# Patient Record
Sex: FEMALE | Race: WHITE | Employment: OTHER | URBAN - METROPOLITAN AREA
[De-identification: names, ages, dates, MRNs, and addresses within clinical notes are randomized per-mention and may not be internally consistent; named-entity substitution may affect disease eponyms.]

---

## 2017-01-26 ENCOUNTER — OFFICE VISIT (OUTPATIENT)
Dept: FAMILY MEDICINE CLINIC | Age: 82
End: 2017-01-26

## 2017-01-26 VITALS
TEMPERATURE: 97.8 F | OXYGEN SATURATION: 94 % | BODY MASS INDEX: 26.83 KG/M2 | DIASTOLIC BLOOD PRESSURE: 78 MMHG | SYSTOLIC BLOOD PRESSURE: 160 MMHG | HEART RATE: 98 BPM | WEIGHT: 142 LBS

## 2017-01-26 DIAGNOSIS — Z85.3 HISTORY OF BREAST CANCER: ICD-10-CM

## 2017-01-26 DIAGNOSIS — S09.90XA HEAD TRAUMA, INITIAL ENCOUNTER: ICD-10-CM

## 2017-01-26 DIAGNOSIS — I48.0 INTERMITTENT ATRIAL FIBRILLATION (HCC): ICD-10-CM

## 2017-01-26 PROCEDURE — 99214 OFFICE O/P EST MOD 30 MIN: CPT | Performed by: FAMILY MEDICINE

## 2017-01-26 RX ORDER — MOMETASONE FUROATE 1 MG/G
CREAM TOPICAL
Qty: 15 G | Refills: 1 | Status: SHIPPED | OUTPATIENT
Start: 2017-01-26 | End: 2018-12-11

## 2017-05-10 ENCOUNTER — OFFICE VISIT (OUTPATIENT)
Dept: FAMILY MEDICINE CLINIC | Age: 82
End: 2017-05-10

## 2017-05-10 VITALS
HEART RATE: 84 BPM | SYSTOLIC BLOOD PRESSURE: 136 MMHG | BODY MASS INDEX: 27.4 KG/M2 | DIASTOLIC BLOOD PRESSURE: 78 MMHG | WEIGHT: 145 LBS | OXYGEN SATURATION: 95 % | RESPIRATION RATE: 16 BRPM | TEMPERATURE: 98 F

## 2017-05-10 DIAGNOSIS — I48.0 INTERMITTENT ATRIAL FIBRILLATION (HCC): ICD-10-CM

## 2017-05-10 DIAGNOSIS — B35.4 TINEA CORPORIS: Primary | ICD-10-CM

## 2017-07-19 ENCOUNTER — OFFICE VISIT (OUTPATIENT)
Dept: FAMILY MEDICINE CLINIC | Age: 82
End: 2017-07-19

## 2017-07-19 VITALS
SYSTOLIC BLOOD PRESSURE: 162 MMHG | TEMPERATURE: 97.9 F | OXYGEN SATURATION: 95 % | DIASTOLIC BLOOD PRESSURE: 82 MMHG | BODY MASS INDEX: 27.68 KG/M2 | WEIGHT: 146.5 LBS | HEART RATE: 76 BPM | RESPIRATION RATE: 18 BRPM

## 2017-07-19 DIAGNOSIS — L30.8 OTHER ECZEMA: ICD-10-CM

## 2017-07-19 DIAGNOSIS — I48.0 INTERMITTENT ATRIAL FIBRILLATION (HCC): ICD-10-CM

## 2017-07-19 DIAGNOSIS — M65.332 TRIGGER FINGER, LEFT MIDDLE FINGER: ICD-10-CM

## 2017-07-19 PROBLEM — L30.9 ECZEMA: Status: ACTIVE | Noted: 2017-07-19

## 2017-08-15 ENCOUNTER — OFFICE VISIT (OUTPATIENT)
Dept: FAMILY MEDICINE CLINIC | Age: 82
End: 2017-08-15

## 2017-08-15 VITALS
SYSTOLIC BLOOD PRESSURE: 150 MMHG | TEMPERATURE: 97.9 F | HEART RATE: 76 BPM | OXYGEN SATURATION: 95 % | RESPIRATION RATE: 18 BRPM | DIASTOLIC BLOOD PRESSURE: 84 MMHG

## 2017-08-15 DIAGNOSIS — R03.0 BLOOD PRESSURE ELEVATED WITHOUT HISTORY OF HTN: Primary | ICD-10-CM

## 2017-08-15 DIAGNOSIS — I63.20 CEREBRAL INFARCTION DUE TO OCCLUSION OF PRECEREBRAL ARTERY (HCC): ICD-10-CM

## 2017-09-13 ENCOUNTER — OUTSIDE SERVICES (OUTPATIENT)
Dept: FAMILY MEDICINE CLINIC | Age: 82
End: 2017-09-13

## 2017-09-13 VITALS
SYSTOLIC BLOOD PRESSURE: 150 MMHG | TEMPERATURE: 97.9 F | DIASTOLIC BLOOD PRESSURE: 84 MMHG | HEART RATE: 76 BPM | OXYGEN SATURATION: 95 % | RESPIRATION RATE: 18 BRPM

## 2017-09-13 DIAGNOSIS — L30.9 ECZEMA, UNSPECIFIED TYPE: ICD-10-CM

## 2017-09-13 DIAGNOSIS — I10 ESSENTIAL HYPERTENSION: Primary | ICD-10-CM

## 2017-11-07 ENCOUNTER — OUTSIDE SERVICES (OUTPATIENT)
Dept: FAMILY MEDICINE CLINIC | Age: 82
End: 2017-11-07

## 2017-11-07 VITALS
WEIGHT: 150 LBS | RESPIRATION RATE: 18 BRPM | TEMPERATURE: 98 F | SYSTOLIC BLOOD PRESSURE: 185 MMHG | OXYGEN SATURATION: 96 % | HEART RATE: 73 BPM | BODY MASS INDEX: 28.34 KG/M2 | DIASTOLIC BLOOD PRESSURE: 80 MMHG

## 2017-11-07 DIAGNOSIS — L30.9 ECZEMA, UNSPECIFIED TYPE: ICD-10-CM

## 2017-11-07 DIAGNOSIS — I10 UNCONTROLLED HYPERTENSION: Primary | ICD-10-CM

## 2017-11-07 DIAGNOSIS — I48.0 INTERMITTENT ATRIAL FIBRILLATION (HCC): ICD-10-CM

## 2017-11-07 NOTE — PROGRESS NOTES
goiter, trachea midline. Chest:  Respirations easy and unlabored              Lungs - clear to auscultation     Breath sounds - equal  Heart:   Rhythm - regular              Murmur - none   Gallop - none               JVD - none              Pretibial pitting edema - none  She has an erythematous, scaly flaking rash on her left hand. Her rash on her knees appears to be doing well. Maci Galvan MD    The note was completed using Dragon voice recognition transcription. Every effort was made to ensure accuracy; however, inadvertent  transcription errors may be present despite my best efforts to edit errors.

## 2017-12-13 ENCOUNTER — OUTSIDE SERVICES (OUTPATIENT)
Dept: FAMILY MEDICINE CLINIC | Age: 82
End: 2017-12-13

## 2017-12-13 DIAGNOSIS — I10 UNCONTROLLED HYPERTENSION: Primary | ICD-10-CM

## 2017-12-13 NOTE — PROGRESS NOTES
[facility] ASSISTED LIVING PROGRESS NOTE    Patient: Chloe Mark     : 1926   Date of service:  2017      Assessment and plan  1. Uncontrolled hypertension  Increase amlodipine up to 5 mg a day      All chronic problems evaluated today are stable or controlled unless noted above. Continue all current medications and treatment plan as outlined in orders    Subjective  Patient seen at the [facility] assisted living facility for routine nursing home followup and for reevaluation of her uncontrolled hypertension. Her blood pressures are a little better but consistently above 140/90 with 2.5 mg of amlodipine. She is tolerating it well. She denies any headache dizziness shortness of breath or weakness. 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 Patient is pleasant and cooperative, in no acute distress  Neck has no masses or goiter. .  Chest:  Respirations easy and unlabored              Lungs - clear to auscultation     Breath sounds - equal  Heart:   Rhythm - regular              Murmur - none   Gallop - none               JVD - none              Pretibial pitting edema - none            Alicia Mondragon MD    The note was completed using Dragon voice recognition transcription. Every effort was made to ensure accuracy; however, inadvertent  transcription errors may be present despite my best efforts to edit errors.

## 2018-01-09 ENCOUNTER — OUTSIDE SERVICES (OUTPATIENT)
Dept: FAMILY MEDICINE CLINIC | Age: 83
End: 2018-01-09

## 2018-01-09 VITALS
OXYGEN SATURATION: 94 % | RESPIRATION RATE: 18 BRPM | BODY MASS INDEX: 27.96 KG/M2 | DIASTOLIC BLOOD PRESSURE: 79 MMHG | HEART RATE: 90 BPM | WEIGHT: 148 LBS | TEMPERATURE: 98.5 F | SYSTOLIC BLOOD PRESSURE: 145 MMHG

## 2018-01-09 DIAGNOSIS — I10 BENIGN ESSENTIAL HYPERTENSION: ICD-10-CM

## 2018-01-09 DIAGNOSIS — I48.0 INTERMITTENT ATRIAL FIBRILLATION (HCC): Primary | ICD-10-CM

## 2018-01-09 NOTE — PROGRESS NOTES
[facility] ASSISTED LIVING PROGRESS NOTE    Patient: Chester Harper     : 1926   Date of service:  2018      Assessment and plan  1. Intermittent atrial fibrillation (HCC) Asymptomatic     2. Benign essential hypertension Now stable       All chronic problems evaluated today are stable or controlled unless noted above. Continue all current medications and treatment plan as outlined in orders    Subjective  Patient seen at the [facility] assisted living facility for routine nursing home followup and for reevaluation of her medical problems. She is now on 5 mg of amlodipine a report she is tolerating it well. Her blood pressures are coming down into a much better range. She's not had any symptoms of A. fib. 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 She's in no acute distress. She is alert and oriented  Chest:  Respirations easy and unlabored              Lungs - clear to auscultation     Breath sounds - equal  Heart:   Rhythm - regular              Murmur - none   Gallop - none               JVD - none              Pretibial pitting edema - none            Salty Grewal MD    The note was completed using Dragon voice recognition transcription. Every effort was made to ensure accuracy; however, inadvertent  transcription errors may be present despite my best efforts to edit errors.

## 2018-01-20 PROBLEM — S42.341B: Status: ACTIVE | Noted: 2018-01-20

## 2018-01-21 ENCOUNTER — CLINICAL DOCUMENTATION (OUTPATIENT)
Dept: ORTHOPEDIC SURGERY | Age: 83
End: 2018-01-21

## 2018-01-24 ENCOUNTER — OUTSIDE SERVICES (OUTPATIENT)
Dept: FAMILY MEDICINE CLINIC | Age: 83
End: 2018-01-24

## 2018-01-24 DIAGNOSIS — I63.20 CEREBRAL INFARCTION DUE TO OCCLUSION OF PRECEREBRAL ARTERY (HCC): ICD-10-CM

## 2018-01-24 DIAGNOSIS — G56.31 NEUROPATHY OF RIGHT RADIAL NERVE: ICD-10-CM

## 2018-01-24 DIAGNOSIS — I48.0 INTERMITTENT ATRIAL FIBRILLATION (HCC): ICD-10-CM

## 2018-01-24 DIAGNOSIS — I10 BENIGN ESSENTIAL HYPERTENSION: ICD-10-CM

## 2018-01-24 DIAGNOSIS — S42.341B: Primary | ICD-10-CM

## 2018-01-24 PROCEDURE — 99305 1ST NF CARE MODERATE MDM 35: CPT | Performed by: FAMILY MEDICINE

## 2018-01-24 NOTE — PROGRESS NOTES
mouth daily.  Coenzyme Q10 (COQ10 PO) Take  by mouth daily. No current facility-administered medications for this visit. Allergies   Allergen Reactions    Lisinopril Other (See Comments)     Diarrhea, weakness, malaise    Tetracyclines & Related      intolerance    Pcn [Penicillins] Rash        Family History   Problem Relation Age of Onset    Cancer Mother     Heart Disease Father     Diabetes Paternal Grandmother         Social History   Substance Use Topics    Smoking status: Never Smoker    Smokeless tobacco: Never Used    Alcohol use No         Immunization History   Administered Date(s) Administered    Pneumococcal Polysaccharide ([id number]) 11/19/2003       Review of systems:    Constitutional:     Unexplained weight loss - no     Fever - no  Eye:     Drainage/matting - no     Blurred vision - no  ENT:     Head congestion - no     Hearing loss - no  Respiratory     Dyspnea  - no     Wheeze - no  Cardiac:     Chest pain or discomfort - no     Edema - no     Gastrointestinal     Constipation - no     Diarrhea - no  Urologic     Urinary incontinence - no     Dysuria - no  Skin:     Rash - no     Decubitus - no  Neurologic     Headache - no     Dizziness - no     Psychiatric        Anxiety - no     Depression - no    Objective:  Vitals 4/94/1087   SYSTOLIC 737    DIASTOLIC 68    Pulse 88   Temp 97.1   Resp 88       Patient is alert, oriented, and in no acute distress. She has a resolving ecchymosis with no deformity on the right side of her face. There is a minor abrasion here.   Eyes:  Conjunctivae and lids are clear             Pupils are equal, round, and reactive, irises nondeformed  Ears:  External ears and nose unremarkable  Mouth:  Lips, gums, tongue, oral mucosa unremarkable  Throat: Palates unremarkable               Pharynx clear  Nose: clear  Neck:  No masses, trachea midline, phonation normal, thyroid unremarkable  Lymphatics:  No significant cervical or supraclavicular adenopathy  Chest:  No deformity of thorax              Respirations easy and unlabored with equal breath sounds              Lungs clear  Heart:  Rhythm - regular               Murmurs - none              Gallop - none               JVD - none              Pretibial edema - none  Abdomen:  Soft  with no masses noted                     Hernia - none                    Liver and spleen not palpably enlarged                    Bowel sounds are normally active                    Tenderness - none                    Rebound or rididity - none  Neurologic:  Cranial nerves 2-12 are intact except for some mild dysarthria which is old                     No focal motor deficits found except for a mildly weak right hand grasp with clear weakness of flexion and extension of the thumb                     Reflexes in lower extremities are intact and symmetrical  Skin: Warm and dry, turgor normal           No rash            No decubitus. No palpable subcutaneous masses  Psychiatric: mood and affect intact                     speech and thought processes seem appropriate     The note was completed using Dragon voice recognition transcription. Every effort was made to ensure accuracy; however, inadvertent  transcription errors may be present despite my best efforts to edit errors.

## 2018-02-12 ENCOUNTER — HOSPITAL ENCOUNTER (OUTPATIENT)
Dept: OCCUPATIONAL THERAPY | Age: 83
Discharge: HOME | End: 2018-02-12
Attending: ORTHOPAEDIC SURGERY | Admitting: ORTHOPAEDIC SURGERY

## 2018-02-12 ENCOUNTER — OFFICE VISIT (OUTPATIENT)
Dept: ORTHOPEDIC SURGERY | Age: 83
End: 2018-02-12

## 2018-02-12 ENCOUNTER — HOSPITAL ENCOUNTER (OUTPATIENT)
Dept: OCCUPATIONAL THERAPY | Age: 83
Discharge: HOME | End: 2018-02-28
Admitting: ORTHOPAEDIC SURGERY

## 2018-02-12 VITALS — HEIGHT: 61 IN | BODY MASS INDEX: 27.94 KG/M2 | WEIGHT: 148 LBS

## 2018-02-12 DIAGNOSIS — S42.351A CLOSED DISPLACED COMMINUTED FRACTURE OF SHAFT OF RIGHT HUMERUS, INITIAL ENCOUNTER: ICD-10-CM

## 2018-02-12 DIAGNOSIS — M89.8X2 PAIN OF RIGHT HUMERUS: Primary | ICD-10-CM

## 2018-02-12 PROCEDURE — 99024 POSTOP FOLLOW-UP VISIT: CPT | Performed by: ORTHOPAEDIC SURGERY

## 2018-02-12 NOTE — PROGRESS NOTES
[address] [place], [address]                  [] [place]     [] [facility]            [] [facility]           [x] More Salazar      []  [facility] ([facility])  ________________________________________________________________________    Patient: Shyla Mendoza   : 1926  MRN: [id number]  Referring Physician: Referring Practitioner: Dr. Benita Santiago    Evaluation Date: 2018      Medical Diagnosis Information:  Diagnosis: R radial nerve palsy, s/p humeral fx/ORIF (M89.932)           Occupational Therapy Splint Certification Form  Dear Benita Santiago,  The following patient has been evaluated for occupational therapy services for fabrication of a hand/wrist extension brace. Medicare requires the referring physician to review the treatment plan. Please review the attached evaluation and/or summary of the patient's plan of care, and verify that you agree by signing the attached document and sending it back to our office. Plan of Care/Treatment to date:  [x] Fabrication of custom hand/wrist resting splint       [x] Instruction on splint use, care and wearing schedule        [x] Follow up as needed for splint modifications          Frequency/Duration:  [x] One time visit for splint fabrication and instructions. Follow up as needed for splint modifcations        [] Splint fabricated, patient to return for full evaluation.     Rehab Potential: [x] good [] fair  [] poor       SPLINT EVALUATION    Date: 2018  Name: Shyla Mendoza            : 1926      Medical/Treatment Diagnosis Information:  · Diagnosis: R radial nerve palsy, s/p humeral fx/ORIF (Y56.312)  Insurance/Certification information:  OT Insurance Information: Medicare  Physician Information:  Referring Practitioner: Dr. Benita Nunez MD Appointment: 18    Subjective  History of Injury/ Mechanism of Injury: Fall onto arm  Surgery Date: 1/21/18  Dominant Hand:    [x] Right []Left  Occupational/Vocational Status: retired  Progress of any previous OT/PT: the patient [x]has/ []has not received OT/PT previously for this diagnosis. Pain: 7/10    Objective Findings:   ROM, strength, edema, wound/ scar appearance, function:    Type of splint:   Resting hand/wrist, all fingers  Splint protocol utilization:     Splint Purpose: [x]Immobilize or protect []Promote healing of    []Relieve pain  []Provide support for improved hand function []Maximize joint motion    Treatment:   [x]Splint provided ([x]Customized/ []Prefabricated), and splint rationale explained. []Patient instructed in []wear/ []care of splint and educated regarding diagnosis. []Patient instructed in symptom reduction techniques   []HEP instruction    []Discussed ADL assistive device    Written Information Distributed: []HEP  []Splint care and wearing protocol    Patient response to evaluation and instructions:  [x]Attentive/interested   []Asked questions/ retained info  []Appeared disinterested  []Poor retention of information  []Appeared anxious/ fearful    Assessment and Plan:  Goals: [x]Patient will be able to verbalize rationale for, and demonstrate proper wearing     of splint. [x]Splint will provide proper fit and function. []Patient will be able to verbalize 2-3 ways to prevent further symptoms. []Patient will be able to don and doff independently. []Patient will be independent with HEP    Goals met:  [x]yes []no    Plan:  [x]Splint completed with good fit and function. Hand Therapy to follow up for     splint modifications as needed    []Splint completed; OT/PT evaluation initiated. Patient to return for further     treatment.     Madan Rodriguez OT/L, [facility]

## 2018-02-14 ENCOUNTER — OUTSIDE SERVICES (OUTPATIENT)
Dept: FAMILY MEDICINE CLINIC | Age: 83
End: 2018-02-14

## 2018-02-14 VITALS
BODY MASS INDEX: 29.78 KG/M2 | RESPIRATION RATE: 18 BRPM | SYSTOLIC BLOOD PRESSURE: 179 MMHG | OXYGEN SATURATION: 94 % | TEMPERATURE: 98.6 F | HEART RATE: 86 BPM | DIASTOLIC BLOOD PRESSURE: 75 MMHG | WEIGHT: 157.6 LBS

## 2018-02-14 DIAGNOSIS — I10 BENIGN ESSENTIAL HYPERTENSION: ICD-10-CM

## 2018-02-14 DIAGNOSIS — D63.8 ANEMIA OF CHRONIC DISEASE: ICD-10-CM

## 2018-02-14 DIAGNOSIS — S42.341D: Primary | ICD-10-CM

## 2018-02-14 DIAGNOSIS — G56.31 NEUROPATHY OF RIGHT RADIAL NERVE: ICD-10-CM

## 2018-02-14 PROCEDURE — 99309 SBSQ NF CARE MODERATE MDM 30: CPT | Performed by: FAMILY MEDICINE

## 2018-02-14 NOTE — PROGRESS NOTES
[facility] PROGRESS NOTE    Patient: Armida Youssef     : 1926   Date of service:  2018    Status: skilled     Assessment and plan  1. Open displaced spiral fracture of shaft of right humerus with routine healing, subsequent encounter - improving    2. Neuropathy of right radial nerve - slowly improving    3. Benign essential hypertension - uncontrolled Increase amlodipine to 10 mg q day   4. Anemia of chronic disease stable    Continue all current medications and treatment plan as outlined in orders    Subjective  Patient seen at the [facility] facility for routine nursing home followup and for reevaluation of her medical problems. She has no complaints. Her arm seems to be healing well with no significant pain issues. She still has the weakness of her right hand grasps. She is wearing a brace, but reports it is definitely improving. Unfortunately her blood pressure has been up more lately. She otherwise feels well. Nurses report no other issues. 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 Objective  Vitals    SYSTOLIC 976   DIASTOLIC 75   Pulse 86   Temp 98.6   Resp 18   Weight 157 lb 9.6 oz   Height -   BMI (wt*703/ht~2) -   Pain Level -   Some recent data might be hidden      Patient is Alert and in no acute distress. Neck has no masses or goiter, trachea midline.   Chest:  Respirations easy and unlabored

## 2018-02-16 DIAGNOSIS — S42.341D: Primary | ICD-10-CM

## 2018-02-16 RX ORDER — HYDROCODONE BITARTRATE AND ACETAMINOPHEN 5; 325 MG/1; MG/1
1 TABLET ORAL EVERY 4 HOURS PRN
Qty: 40 TABLET | Refills: 0 | Status: SHIPPED | OUTPATIENT
Start: 2018-02-16 | End: 2018-02-23 | Stop reason: HOSPADM

## 2018-02-26 ENCOUNTER — OFFICE VISIT (OUTPATIENT)
Dept: ORTHOPEDIC SURGERY | Age: 83
End: 2018-02-26

## 2018-02-26 VITALS — WEIGHT: 148 LBS | HEIGHT: 61 IN | BODY MASS INDEX: 27.94 KG/M2

## 2018-02-26 DIAGNOSIS — M89.8X2 PAIN OF RIGHT HUMERUS: Primary | ICD-10-CM

## 2018-02-26 PROCEDURE — 99024 POSTOP FOLLOW-UP VISIT: CPT | Performed by: ORTHOPAEDIC SURGERY

## 2018-02-26 NOTE — PROGRESS NOTES
she was nonambulatory today in a wheelchair. Reflex normal    Additional Comments:       Additional Examinations:         Left Upper Extremity: Examination of the left upper extremity does not show any tenderness, deformity or injury. Range of motion is unremarkable. There is no gross instability. There are no rashes, ulcerations or lesions. Strength and tone are normal.    Radiology:     X-rays obtained and reviewed in office (2 views of right humerus)  Satisfactory alignment of the fracture. Good placement of the plate. No change in alignment since surgery      Assessment :  Status post a humeral shaft fracture open reduction internal fixation with a radial nerve palsy    Impression:  Encounter Diagnosis   Name Primary?  Pain of right humerus Yes       Office Procedures:  Orders Placed This Encounter   Procedures    XR HUMERUS RIGHT (MIN 2 VIEWS)     Order Specific Question:   Reason for exam:     Answer:   Arm Pain       Treatment Plan: Today we are going to discontinue her sling and then also the Ace wrap on her upper arm. She'll continue to use the splint on her forearm. We suggested to therapy that she begin some gentle range of motion of her elbow. I think it's okay for her to return to assisted living which is essentially down the building from where she is now in skilled care. She will follow up with us again in 3 weeks with repeat x-ray of her humerus.

## 2018-03-01 ENCOUNTER — HOSPITAL ENCOUNTER (OUTPATIENT)
Dept: OCCUPATIONAL THERAPY | Age: 83
Discharge: HOME | End: 2018-03-31
Attending: ORTHOPAEDIC SURGERY | Admitting: ORTHOPAEDIC SURGERY

## 2018-03-07 ENCOUNTER — OUTSIDE SERVICES (OUTPATIENT)
Dept: FAMILY MEDICINE CLINIC | Age: 83
End: 2018-03-07

## 2018-03-07 DIAGNOSIS — I10 BENIGN ESSENTIAL HYPERTENSION: ICD-10-CM

## 2018-03-07 DIAGNOSIS — G56.31 NEUROPATHY OF RIGHT RADIAL NERVE: ICD-10-CM

## 2018-03-07 DIAGNOSIS — S42.341D: Primary | ICD-10-CM

## 2018-03-07 PROCEDURE — 99308 SBSQ NF CARE LOW MDM 20: CPT | Performed by: FAMILY MEDICINE

## 2018-03-19 ENCOUNTER — OFFICE VISIT (OUTPATIENT)
Dept: ORTHOPEDIC SURGERY | Age: 83
End: 2018-03-19

## 2018-03-19 VITALS — BODY MASS INDEX: 27.94 KG/M2 | HEIGHT: 61 IN | WEIGHT: 148 LBS

## 2018-03-19 DIAGNOSIS — M89.8X2 PAIN OF RIGHT HUMERUS: Primary | ICD-10-CM

## 2018-03-19 PROCEDURE — 99024 POSTOP FOLLOW-UP VISIT: CPT | Performed by: ORTHOPAEDIC SURGERY

## 2018-03-19 NOTE — PROGRESS NOTES
There are no rashes, ulcerations or lesions. Strength and tone are normal.    Radiology:     X-rays obtained and reviewed in office (2 views of right humerus)  I think her fracture appears to be healed. Assessment :  Status post a humeral shaft fracture open reduction internal fixation with a radial nerve palsy. Seems that her fracture is healed and her radial nerve palsy is gradually improving. Impression:  Encounter Diagnosis   Name Primary?  Pain of right humerus Yes       Office Procedures:  Orders Placed This Encounter   Procedures    XR HUMERUS RIGHT (MIN 2 VIEWS)     Order Specific Question:   Reason for exam:     Answer:   Arm Pain       Treatment Plan: Today we will suggest that they continue to perform therapy for the right hand wrist elbow and shoulder. We can let her discontinue the use of her hand splint during the day she must wear it starting about 8 PM through the night. I think it's okay for her to return to her apartment. I thought that they were to do that after her last visit. Her son today talked about getting her lift chair other than the regular recliner    She'll follow up with me again in about 8 weeks. No reason to repeat x-ray the arm.

## 2018-04-02 ENCOUNTER — TELEPHONE (OUTPATIENT)
Dept: FAMILY MEDICINE CLINIC | Age: 83
End: 2018-04-02

## 2018-04-02 DIAGNOSIS — S42.341D: ICD-10-CM

## 2018-04-02 RX ORDER — HYDROCODONE BITARTRATE AND ACETAMINOPHEN 5; 325 MG/1; MG/1
1 TABLET ORAL EVERY 4 HOURS PRN
Qty: 90 TABLET | Refills: 0 | Status: SHIPPED | OUTPATIENT
Start: 2018-04-02 | End: 2018-05-02

## 2018-05-21 ENCOUNTER — OFFICE VISIT (OUTPATIENT)
Dept: ORTHOPEDIC SURGERY | Age: 83
End: 2018-05-21

## 2018-05-21 VITALS — WEIGHT: 148 LBS | HEIGHT: 61 IN | BODY MASS INDEX: 27.94 KG/M2

## 2018-05-21 DIAGNOSIS — S42.351A CLOSED DISPLACED COMMINUTED FRACTURE OF SHAFT OF RIGHT HUMERUS, INITIAL ENCOUNTER: Primary | ICD-10-CM

## 2018-05-21 PROCEDURE — G8419 CALC BMI OUT NRM PARAM NOF/U: HCPCS | Performed by: PHYSICIAN ASSISTANT

## 2018-05-21 PROCEDURE — 1123F ACP DISCUSS/DSCN MKR DOCD: CPT | Performed by: PHYSICIAN ASSISTANT

## 2018-05-21 PROCEDURE — 1036F TOBACCO NON-USER: CPT | Performed by: PHYSICIAN ASSISTANT

## 2018-05-21 PROCEDURE — G8598 ASA/ANTIPLAT THER USED: HCPCS | Performed by: PHYSICIAN ASSISTANT

## 2018-05-21 PROCEDURE — 4040F PNEUMOC VAC/ADMIN/RCVD: CPT | Performed by: PHYSICIAN ASSISTANT

## 2018-05-21 PROCEDURE — 99213 OFFICE O/P EST LOW 20 MIN: CPT | Performed by: PHYSICIAN ASSISTANT

## 2018-05-21 PROCEDURE — G8427 DOCREV CUR MEDS BY ELIG CLIN: HCPCS | Performed by: PHYSICIAN ASSISTANT

## 2018-05-21 PROCEDURE — 1090F PRES/ABSN URINE INCON ASSESS: CPT | Performed by: PHYSICIAN ASSISTANT

## 2018-06-13 ENCOUNTER — OUTSIDE SERVICES (OUTPATIENT)
Dept: FAMILY MEDICINE CLINIC | Age: 83
End: 2018-06-13

## 2018-06-13 DIAGNOSIS — M79.89 SWELLING OF BOTH LOWER EXTREMITIES: ICD-10-CM

## 2018-06-13 DIAGNOSIS — I10 BENIGN ESSENTIAL HYPERTENSION: Primary | ICD-10-CM

## 2018-06-13 DIAGNOSIS — G56.31 NEUROPATHY OF RIGHT RADIAL NERVE: ICD-10-CM

## 2018-06-13 DIAGNOSIS — I48.0 INTERMITTENT ATRIAL FIBRILLATION (HCC): ICD-10-CM

## 2018-08-07 ENCOUNTER — OUTSIDE SERVICES (OUTPATIENT)
Dept: FAMILY MEDICINE CLINIC | Age: 83
End: 2018-08-07

## 2018-08-07 DIAGNOSIS — I10 BENIGN ESSENTIAL HYPERTENSION: ICD-10-CM

## 2018-08-07 DIAGNOSIS — G56.31 NEUROPATHY OF RIGHT RADIAL NERVE: ICD-10-CM

## 2018-08-07 DIAGNOSIS — L20.84 INTRINSIC ECZEMA: Primary | ICD-10-CM

## 2018-08-07 DIAGNOSIS — I48.0 INTERMITTENT ATRIAL FIBRILLATION (HCC): ICD-10-CM

## 2018-08-07 NOTE — PROGRESS NOTES
Allergic rhinosinusitis      Review of systems negative for headache, dizziness, edema, or shortness of breath    Objective  Blood pressure today is 167/70, pulse 80 respirations 20 temp 97.8. Patient is alert, oriented, no acute distress. Neck has no masses or goiter, trachea midline. Chest:  Respirations easy and unlabored              Lungs - clear to auscultation     Breath sounds - equal  Heart:   Regular rhythm with no murmur rub or gallop. Ears no JVD or pitting peripheral edema. Examination of her extremities finds a large patch on her left palm of raised rough dry cracked skin consistent with her eczema            Tracey Lange MD    The note was completed using Dragon voice recognition transcription. Every effort was made to ensure accuracy; however, inadvertent  transcription errors may be present despite my best efforts to edit errors.

## 2018-09-14 ENCOUNTER — OFFICE VISIT (OUTPATIENT)
Dept: FAMILY MEDICINE CLINIC | Age: 83
End: 2018-09-14

## 2018-09-14 VITALS
SYSTOLIC BLOOD PRESSURE: 152 MMHG | WEIGHT: 153 LBS | HEART RATE: 84 BPM | TEMPERATURE: 98 F | OXYGEN SATURATION: 94 % | BODY MASS INDEX: 28.91 KG/M2 | DIASTOLIC BLOOD PRESSURE: 72 MMHG

## 2018-09-14 DIAGNOSIS — H91.93 BILATERAL HEARING LOSS, UNSPECIFIED HEARING LOSS TYPE: ICD-10-CM

## 2018-09-14 DIAGNOSIS — D48.5 NEOPLASM OF UNCERTAIN BEHAVIOR OF SKIN: Primary | ICD-10-CM

## 2018-09-14 DIAGNOSIS — L20.84 INTRINSIC ECZEMA: ICD-10-CM

## 2018-09-14 DIAGNOSIS — I10 BENIGN ESSENTIAL HYPERTENSION: ICD-10-CM

## 2018-09-14 PROCEDURE — 1090F PRES/ABSN URINE INCON ASSESS: CPT | Performed by: FAMILY MEDICINE

## 2018-09-14 PROCEDURE — G8598 ASA/ANTIPLAT THER USED: HCPCS | Performed by: FAMILY MEDICINE

## 2018-09-14 PROCEDURE — 1036F TOBACCO NON-USER: CPT | Performed by: FAMILY MEDICINE

## 2018-09-14 PROCEDURE — 1101F PT FALLS ASSESS-DOCD LE1/YR: CPT | Performed by: FAMILY MEDICINE

## 2018-09-14 PROCEDURE — 99213 OFFICE O/P EST LOW 20 MIN: CPT | Performed by: FAMILY MEDICINE

## 2018-09-14 PROCEDURE — G8419 CALC BMI OUT NRM PARAM NOF/U: HCPCS | Performed by: FAMILY MEDICINE

## 2018-09-14 PROCEDURE — 1123F ACP DISCUSS/DSCN MKR DOCD: CPT | Performed by: FAMILY MEDICINE

## 2018-09-14 PROCEDURE — 17000 DESTRUCT PREMALG LESION: CPT | Performed by: FAMILY MEDICINE

## 2018-09-14 PROCEDURE — G8427 DOCREV CUR MEDS BY ELIG CLIN: HCPCS | Performed by: FAMILY MEDICINE

## 2018-09-14 PROCEDURE — 4040F PNEUMOC VAC/ADMIN/RCVD: CPT | Performed by: FAMILY MEDICINE

## 2018-09-14 ASSESSMENT — PATIENT HEALTH QUESTIONNAIRE - PHQ9
2. FEELING DOWN, DEPRESSED OR HOPELESS: 0
1. LITTLE INTEREST OR PLEASURE IN DOING THINGS: 0
SUM OF ALL RESPONSES TO PHQ QUESTIONS 1-9: 0
SUM OF ALL RESPONSES TO PHQ9 QUESTIONS 1 & 2: 0
SUM OF ALL RESPONSES TO PHQ QUESTIONS 1-9: 0

## 2018-09-14 NOTE — PROGRESS NOTES
Assessment and plan  1. Neoplasm of uncertain behavior of skin  The lesion was thoroughly frozen with liquid nitrogen. I advised her if it was not resolved in 2-3 weeks to notify us so we could refer for excision    2. Bilateral hearing loss, unspecified hearing loss type  Likely presbycusis    3. Benign essential hypertension  Under adequate control    4. Intrinsic eczema  Reassured her she could use the Elocon b.i.d. prn    Return to clinic or call prn if these symptoms worsen or fail to improve and resolve as discussed. Otherwise we'll reassess at assisted living as scheduled    Subjective  Patient is in the assisted living in [facility]. She called and had a friend bring her up with multiple concerns. The first was a slowly changing lesion on the bridge of her nose. The second one was worsening bilateral hearing loss. She wears hearing aids as is concerned there was wax. She also when my opinion on if she was using her Elocon right on her left hand. She also went to me to do a cardiovascular exam for her blood pressure. Objective  BP (!) 152/72   Pulse 84   Temp 98 °F (36.7 °C) (Oral)   Wt 153 lb (69.4 kg)   SpO2 94%   BMI 28.91 kg/m²   Patient is alert, oriented, and in no acute distress. Psych: mood and affect unremarkable                speech and thought processes intact  ENT: External ears canals and TMs are unremarkable. On the bridge of her nose is a several millimeter rough nodular lesion that could be early squamous cell or actinic keratosis  Chest:  No deformity of thorax              Respirations easy and unlabored with equal breath sounds              Lungs clear  Heart:  Regular rhythm. No murmur or JVD. Examination of her hand finds a few patches of scaling. Beverley Bess MD    The note was completed using Dragon voice recognition transcription.  Every effort was made to ensure accuracy; however, inadvertent  transcription errors may be present despite my best efforts to edit errors.

## 2018-09-14 NOTE — PATIENT INSTRUCTIONS
Please compare this printed medication list with your medications at home to be sure they are the same. If you have any medications that are different please contact us immediately at [phone]. Also review your allergies that we have listed, these may also include medications that you have not been able to tolerate, make sure everything listed is correct. If you have any allergies that are different please contact us immediately at [phone].

## 2018-10-09 ENCOUNTER — OUTSIDE SERVICES (OUTPATIENT)
Dept: FAMILY MEDICINE CLINIC | Age: 83
End: 2018-10-09
Payer: MEDICARE

## 2018-10-09 DIAGNOSIS — I63.20 CEREBRAL INFARCTION DUE TO OCCLUSION OF PRECEREBRAL ARTERY (HCC): ICD-10-CM

## 2018-10-09 DIAGNOSIS — G56.31 NEUROPATHY OF RIGHT RADIAL NERVE: ICD-10-CM

## 2018-10-09 DIAGNOSIS — I48.0 INTERMITTENT ATRIAL FIBRILLATION (HCC): ICD-10-CM

## 2018-10-09 DIAGNOSIS — I10 BENIGN ESSENTIAL HYPERTENSION: Primary | ICD-10-CM

## 2018-10-09 NOTE — PROGRESS NOTES
[facility] ASSISTED LIVING PROGRESS NOTE    Patient: Rosalino Watters     : 1926   Date of service:  10/9/2018      Assessment and plan   Diagnosis Orders   1. Benign essential hypertension -Stable     2. Intermittent atrial fibrillation (HCC) Asymptomatic     3. Cerebral infarction due to occlusion of precerebral artery (HCC) -Improving     4. Neuropathy of right radial nerve -Improving       All chronic problems evaluated today are stable or controlled unless noted above. Continue all current medications and treatment plan as outlined in orders    Subjective  Patient seen at the [facility] assisted living facility for routine nursing home followup and for reevaluation of her medical problems. Nursing staff reports they feel she is doing well. She yourself has no complaints. Her blood pressures actually been a little better. No symptoms from A. fib. Her stroke symptoms seem to be improving as is her neuropathy of her right radial nerve. 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 ([facility] [address] 09/10/2015     Priority: High    Neuropathy of right radial nerve 2018     Priority: Medium    Anemia of chronic disease 2018     Priority: Medium    Open displaced spiral fracture of shaft of right humerus 2018     Priority: Medium    Benign essential hypertension 2017     Priority: Medium    Intermittent atrial fibrillation ([facility] [address] 2015     Priority: Medium    Swelling of both lower extremities 2018    Eczema 2017    History of breast cancer 2017    Allergic rhinosinusitis        Objective  Blood pressure 149/72 pulse 78 respirations 20 and unlabored O2 sat 94% temp is 98.7  Patient is alert in no acute distress. Neck exam unremarkable.   Chest:  Respirations easy and unlabored              Lungs - clear to auscultation

## 2018-12-11 ENCOUNTER — OUTSIDE SERVICES (OUTPATIENT)
Dept: FAMILY MEDICINE CLINIC | Age: 83
End: 2018-12-11
Payer: MEDICARE

## 2018-12-11 DIAGNOSIS — L20.84 INTRINSIC ECZEMA: Primary | ICD-10-CM

## 2018-12-11 DIAGNOSIS — I10 BENIGN ESSENTIAL HYPERTENSION: ICD-10-CM

## 2018-12-11 RX ORDER — CLOBETASOL PROPIONATE 0.5 MG/G
CREAM TOPICAL
Qty: 45 G | Refills: 5 | Status: SHIPPED | OUTPATIENT
Start: 2018-12-11

## 2019-01-15 ENCOUNTER — OUTSIDE SERVICES (OUTPATIENT)
Dept: FAMILY MEDICINE CLINIC | Age: 84
End: 2019-01-15
Payer: MEDICARE

## 2019-01-15 DIAGNOSIS — L20.84 INTRINSIC ECZEMA: ICD-10-CM

## 2019-01-15 DIAGNOSIS — I10 BENIGN ESSENTIAL HYPERTENSION: Primary | ICD-10-CM

## 2019-03-05 ENCOUNTER — OUTSIDE SERVICES (OUTPATIENT)
Dept: FAMILY MEDICINE CLINIC | Age: 84
End: 2019-03-05
Payer: MEDICARE

## 2019-03-05 DIAGNOSIS — D63.8 ANEMIA OF CHRONIC DISEASE: ICD-10-CM

## 2019-03-05 DIAGNOSIS — I48.0 INTERMITTENT ATRIAL FIBRILLATION (HCC): ICD-10-CM

## 2019-03-05 DIAGNOSIS — L30.8 OTHER ECZEMA: ICD-10-CM

## 2019-03-05 DIAGNOSIS — I10 UNCONTROLLED HYPERTENSION: Primary | ICD-10-CM

## 2019-06-11 ENCOUNTER — OUTSIDE SERVICES (OUTPATIENT)
Dept: FAMILY MEDICINE CLINIC | Age: 84
End: 2019-06-11
Payer: MEDICARE

## 2019-06-11 DIAGNOSIS — M79.89 SWELLING OF BOTH LOWER EXTREMITIES: ICD-10-CM

## 2019-06-11 DIAGNOSIS — I48.0 INTERMITTENT ATRIAL FIBRILLATION (HCC): ICD-10-CM

## 2019-06-11 DIAGNOSIS — I10 UNCONTROLLED HYPERTENSION: Primary | ICD-10-CM

## 2019-06-11 DIAGNOSIS — L20.84 INTRINSIC ECZEMA: ICD-10-CM

## 2019-06-11 PROCEDURE — 1090F PRES/ABSN URINE INCON ASSESS: CPT | Performed by: FAMILY MEDICINE

## 2019-06-11 PROCEDURE — 1123F ACP DISCUSS/DSCN MKR DOCD: CPT | Performed by: FAMILY MEDICINE

## 2019-06-11 PROCEDURE — G8419 CALC BMI OUT NRM PARAM NOF/U: HCPCS | Performed by: FAMILY MEDICINE

## 2019-06-11 NOTE — PROGRESS NOTES
Priority: Medium    Uncontrolled hypertension     Swelling of both lower extremities 06/13/2018    Eczema 07/19/2017    History of breast cancer 01/26/2017    Allergic rhinosinusitis      Review of systems:  Negative for significant constipation or diarrhea  Negative for headache or dizziness    Objective  Blood pressure 167/76 pulse 80, respirations 18 and unlabored. O2 levels and temperature are stable. Patient is alert, oriented, and in no acute distress. Psych: mood and affect unremarkable                speech and thought processes intact  Neck:  No masses, trachea midline, phonation normal, thyroid unremarkable              No significant cervical or supraclavicular adenopathy  Chest:  No deformity of thorax              Respirations easy and unlabored with equal breath sounds              Lungs clear  Heart: Regular rhythm with no murmur, rub or gallop. No JVD. Pretibial pitting edema -1+ bilaterally. There is some erythema of the right shin but no warmth or tenderness. Tyler Singleton MD    The note was completed using Dragon voice recognition transcription. Every effort was made to ensure accuracy; however, inadvertent  transcription errors may be present despite my best efforts to edit errors.

## 2019-09-10 ENCOUNTER — OUTSIDE SERVICES (OUTPATIENT)
Dept: FAMILY MEDICINE CLINIC | Age: 84
End: 2019-09-10
Payer: MEDICARE

## 2019-09-10 DIAGNOSIS — K62.5 RECTAL BLEEDING: ICD-10-CM

## 2019-09-10 DIAGNOSIS — J20.9 ACUTE BRONCHITIS, UNSPECIFIED ORGANISM: ICD-10-CM

## 2019-09-10 DIAGNOSIS — L20.84 INTRINSIC ECZEMA: ICD-10-CM

## 2019-09-10 DIAGNOSIS — Z86.73 HISTORY OF CVA (CEREBROVASCULAR ACCIDENT): ICD-10-CM

## 2019-09-10 DIAGNOSIS — I48.0 INTERMITTENT ATRIAL FIBRILLATION (HCC): Primary | ICD-10-CM

## 2019-09-10 DIAGNOSIS — I10 BENIGN ESSENTIAL HYPERTENSION: ICD-10-CM

## 2019-09-10 PROBLEM — S42.341B: Status: RESOLVED | Noted: 2018-01-20 | Resolved: 2019-09-10

## 2019-09-10 NOTE — PROGRESS NOTES
[facility] ASSISTED LIVING PROGRESS NOTE    Patient: Kylie Ayon     : 1926   Date of service:  9/10/2019      Assessment and plan   Diagnosis Orders   1. Intermittent atrial fibrillation (HCC)     2. Benign essential hypertension     3. Intrinsic eczema     4. History of CVA (cerebrovascular accident)     5. Acute bronchitis, unspecified organism   Z-Amador, Mucinex   6. Rectal bleeding   CBC, patient agrees to [facility] consultation with Dr. Naya Winkler     All chronic problems evaluated today are stable or controlled unless noted above. Continue all current medications and treatment plan as outlined in orders    Subjective  Patient seen at the [facility] assisted living facility for routine nursing home followup and for reevaluation of her medical problems. The nursing staff is unaware of any new issues. However, the patient reports several concerns. She reports just in the last 24 hours she is developed acute chest congestion and cough producing sputum. She denies dyspnea or wheezing. In addition her review of systems was positive for diarrhea. She admits she has been having some episodes of rectal bleeding lately. Her blood pressures been fairly good for her. She resists any further intensification of blood pressure treatment. She is getting good relief with her steroid cream with the flares of her eczema.   He is in normal sinus rhythm today    Patient Active Problem List    Diagnosis Date Noted    Benign essential hypertension 2017     Priority: High    Neuropathy of right radial nerve 2018     Priority: Medium    Anemia of chronic disease 2018     Priority: Medium    Eczema 2017     Priority: Medium    Intermittent atrial fibrillation ([facility] [address] 2015     Priority: Medium    History of CVA (cerebrovascular accident) 09/10/2015     Priority: Medium    Swelling of both lower extremities 2018    History of breast cancer 2017

## 2019-09-18 ENCOUNTER — INITIAL CONSULT (OUTPATIENT)
Dept: GASTROENTEROLOGY | Age: 84
End: 2019-09-18
Payer: MEDICARE

## 2019-09-18 VITALS
HEIGHT: 61 IN | BODY MASS INDEX: 27.94 KG/M2 | DIASTOLIC BLOOD PRESSURE: 86 MMHG | SYSTOLIC BLOOD PRESSURE: 156 MMHG | WEIGHT: 148 LBS

## 2019-09-18 DIAGNOSIS — K62.5 RECTAL BLEEDING: Primary | ICD-10-CM

## 2019-09-18 PROCEDURE — 99203 OFFICE O/P NEW LOW 30 MIN: CPT | Performed by: INTERNAL MEDICINE

## 2019-09-18 PROCEDURE — 1123F ACP DISCUSS/DSCN MKR DOCD: CPT | Performed by: INTERNAL MEDICINE

## 2019-09-18 PROCEDURE — G8427 DOCREV CUR MEDS BY ELIG CLIN: HCPCS | Performed by: INTERNAL MEDICINE

## 2019-09-18 PROCEDURE — 1090F PRES/ABSN URINE INCON ASSESS: CPT | Performed by: INTERNAL MEDICINE

## 2019-09-18 PROCEDURE — 4040F PNEUMOC VAC/ADMIN/RCVD: CPT | Performed by: INTERNAL MEDICINE

## 2019-09-18 PROCEDURE — G8419 CALC BMI OUT NRM PARAM NOF/U: HCPCS | Performed by: INTERNAL MEDICINE

## 2019-09-18 PROCEDURE — 1036F TOBACCO NON-USER: CPT | Performed by: INTERNAL MEDICINE

## 2019-09-18 RX ORDER — CERAMIDES 1,3,6-II
CLEANSER (ML) TOPICAL
COMMUNITY

## 2019-09-18 RX ORDER — UBIDECARENONE 10 MG
CAPSULE ORAL
COMMUNITY

## 2019-09-18 RX ORDER — GUAIFENESIN 400 MG/1
400 TABLET ORAL 4 TIMES DAILY PRN
COMMUNITY

## 2019-09-18 NOTE — PROGRESS NOTES
Negative for pallor and rash. Allergic/Immunologic: Negative for environmental allergies and immunocompromised state. Neurological: Negative for seizures, syncope. Hematological: Negative for adenopathy. Does not bruise/bleed easily. Psychiatric/Behavioral: Negative for agitation, confusion, hallucinations. PHYSICAL EXAM   BP (!) 154/84 (Site: Left Upper Arm, Position: Sitting, Cuff Size: Small Adult)   Ht 5' 1\" (1.549 m)   Wt 148 lb (67.1 kg)   BMI 27.96 kg/m²   Wt Readings from Last 3 Encounters:   09/18/19 148 lb (67.1 kg)   09/14/18 153 lb (69.4 kg)   05/21/18 148 lb (67.1 kg)     Constitutional: AAO3, elderly lady walks with a walker  HEENT: no pallor or icterus. Neck: supple, no adenopathy  Cardiovascular: Normal heart rate, Normal rhythm, No murmurs,. RS: Normal breath sounds, No wheezing,   Abdomen: soft, non tender, protuberant, BS+, no obvious organomegaly  Extremities:  No edema. FINAL IMPRESSION     Rectal bleeding could be hemorrhoidal. However other etiologies such as large colon polyps, colon cancer are also possible. We discussed doing a colonoscopy to assess the source of the bleeding. Risks and benefits of the procedure were discussed in detail with her. Patient does not want to have a colonoscopy at this time. She seems to understand the issues involved and has been told to call if she changes her mind. Get recent labs done at [facility], these have been requested.

## 2019-12-10 ENCOUNTER — OUTSIDE SERVICES (OUTPATIENT)
Dept: FAMILY MEDICINE CLINIC | Age: 84
End: 2019-12-10
Payer: MEDICARE

## 2019-12-10 DIAGNOSIS — M79.89 SWELLING OF BOTH LOWER EXTREMITIES: ICD-10-CM

## 2019-12-10 DIAGNOSIS — I10 BENIGN ESSENTIAL HYPERTENSION: ICD-10-CM

## 2019-12-10 DIAGNOSIS — I48.0 INTERMITTENT ATRIAL FIBRILLATION (HCC): Primary | ICD-10-CM

## 2019-12-10 DIAGNOSIS — R32 URINARY INCONTINENCE, UNSPECIFIED TYPE: ICD-10-CM

## 2020-02-11 ENCOUNTER — OUTSIDE SERVICES (OUTPATIENT)
Dept: FAMILY MEDICINE CLINIC | Age: 85
End: 2020-02-11
Payer: MEDICARE

## 2020-02-11 NOTE — PROGRESS NOTES
[facility] ASSISTED LIVING PROGRESS NOTE    Patient: Pablo Lopez     : 1926   Date of service:  2020      Assessment and plan   Diagnosis Orders   1. Intermittent atrial fibrillation (HCC)     2. Benign essential hypertension     3. Intrinsic eczema     4. Swelling of both lower extremities     5. E. coli UTI-resolved      All chronic problems evaluated today are stable or controlled unless noted above. Continue all current medications and treatment plan as outlined in orders    Subjective  Patient seen at the [facility] assisted living facility for routine nursing home followup and for reevaluation of her medical problems. The nursing staff reports she is doing well. Her blood pressures have been acceptable for her. She herself has no complaints. Her eczema is doing well. No increased edema. She denies any palpitations or sense of tachycardia. At last visit she had an E. coli UTI which was treated. Her symptoms somewhat persist but seem improved. Patient Active Problem List    Diagnosis Date Noted    Benign essential hypertension 2017     Priority: High    Neuropathy of right radial nerve 2018     Priority: Medium    Anemia of chronic disease 2018     Priority: Medium    Eczema 2017     Priority: Medium    Intermittent atrial fibrillation ([address] 2015     Priority: Medium    History of CVA (cerebrovascular accident) 09/10/2015     Priority: Medium    Rectal bleeding 2019    Swelling of both lower extremities 2018    History of breast cancer 2017    Allergic rhinosinusitis    Social history: Negative for alcohol or tobacco use    Review of systems:  Negative for chest pain or shortness of breath  Negative for fever chills  Negative for constipation or diarrhea    Objective  Blood pressure 150/75, pulse 77 respirations 20 and O2 sat is 94%  Patient is alert, oriented, and in no acute distress.   Psych: mood and affect unremarkable                speech and thought processes intact  Neck:  No masses, trachea midline, phonation normal, thyroid unremarkable              No significant cervical or supraclavicular adenopathy  Chest:  No deformity of thorax              Respirations easy and unlabored with equal breath sounds              Lungs clear  Heart: Regular rhythm with no murmur, rub or gallop. No JVD. Pretibial pitting edema -trace                Jose De Jesus Wall MD    The note was completed using Dragon voice recognition transcription. Every effort was made to ensure accuracy; however, inadvertent  transcription errors may be present despite my best efforts to edit errors.

## 2020-05-05 ENCOUNTER — OUTSIDE SERVICES (OUTPATIENT)
Dept: FAMILY MEDICINE CLINIC | Age: 85
End: 2020-05-05
Payer: MEDICARE

## 2020-05-05 NOTE — PROGRESS NOTES
[facility] ASSISTED LIVING PROGRESS NOTE    Patient: Armida Hennessy     : 1926   Date of service:  2020      Assessment and plan   Diagnosis Orders   1. Benign essential hypertension     2. History of CVA (cerebrovascular accident)   patient declines aspirin   3. Intermittent atrial fibrillation (HCC)-asymptomatic    4. Intrinsic eczema-improved    5. Non-seasonal allergic rhinitis due to other allergic trigger   nursing will attempt to get her another fluticasone bottle     All chronic problems evaluated today are stable or controlled unless noted above. Continue all current medications and treatment plan as outlined in orders    Subjective  Patient seen at the [facility] assisted living facility for routine nursing home followup and for reevaluation of her medical problems. The nursing staff reports she is overall doing well and they have no issues or concerns. Her blood pressure is actually been better than normal with a recent systolic of 312. She has a history of a CVA and recently decided she did not want to take aspirin and refuses to take it now. There is been no symptomatic A. fib. She had a flare of her eczema and was prescribed another steroid cream for her legs and she reports this is doing definitely better. Her only complaint is ongoing congestion since she got her new bottle fluticasone. She does not think the bottle is working, and indeed I could not get it to produce much of a spray.     Patient Active Problem List    Diagnosis Date Noted    Benign essential hypertension 2017     Priority: High    Neuropathy of right radial nerve 2018     Priority: Medium    Anemia of chronic disease 2018     Priority: Medium    Eczema 2017     Priority: Medium    Intermittent atrial fibrillation ([address] 2015     Priority: Medium    History of CVA (cerebrovascular accident) 09/10/2015     Priority: Medium    Rectal bleeding 2019    Swelling of

## 2021-10-12 ENCOUNTER — HOSPITAL ENCOUNTER (OUTPATIENT)
Dept: GENERAL RADIOLOGY | Age: 86
Discharge: HOME | End: 2021-10-12
Payer: MEDICARE

## 2021-10-12 DIAGNOSIS — R13.10 DYSPHAGIA, UNSPECIFIED TYPE: ICD-10-CM

## 2021-10-12 DIAGNOSIS — J18.9 PNEUMONIA DUE TO INFECTIOUS ORGANISM, UNSPECIFIED LATERALITY, UNSPECIFIED PART OF LUNG: ICD-10-CM

## 2021-10-12 PROCEDURE — 74230 X-RAY XM SWLNG FUNCJ C+: CPT

## 2021-10-12 PROCEDURE — 71046 X-RAY EXAM CHEST 2 VIEWS: CPT

## 2023-10-25 ENCOUNTER — APPOINTMENT (OUTPATIENT)
Dept: GENERAL RADIOLOGY | Age: 88
End: 2023-10-25
Payer: MEDICARE

## 2023-10-25 ENCOUNTER — HOSPITAL ENCOUNTER (EMERGENCY)
Age: 88
Discharge: HOME | End: 2023-10-25
Attending: EMERGENCY MEDICINE
Payer: MEDICARE

## 2023-10-25 VITALS
HEART RATE: 86 BPM | TEMPERATURE: 98.4 F | SYSTOLIC BLOOD PRESSURE: 145 MMHG | WEIGHT: 150 LBS | DIASTOLIC BLOOD PRESSURE: 52 MMHG | RESPIRATION RATE: 18 BRPM | HEIGHT: 61 IN | OXYGEN SATURATION: 95 % | BODY MASS INDEX: 28.32 KG/M2

## 2023-10-25 DIAGNOSIS — S70.02XA CONTUSION OF LEFT HIP, INITIAL ENCOUNTER: Primary | ICD-10-CM

## 2023-10-25 DIAGNOSIS — S80.02XA CONTUSION OF LEFT KNEE, INITIAL ENCOUNTER: ICD-10-CM

## 2023-10-25 LAB
ANION GAP SERPL CALCULATED.3IONS-SCNC: 10 MMOL/L (ref 3–16)
BASOPHILS # BLD: 0.1 K/UL (ref 0–0.2)
BASOPHILS NFR BLD: 0.7 %
BUN SERPL-MCNC: 13 MG/DL (ref 7–20)
CALCIUM SERPL-MCNC: 9.4 MG/DL (ref 8.3–10.6)
CHLORIDE SERPL-SCNC: 104 MMOL/L (ref 99–110)
CO2 SERPL-SCNC: 24 MMOL/L (ref 21–32)
CREAT SERPL-MCNC: <0.5 MG/DL (ref 0.6–1.2)
DEPRECATED RDW RBC AUTO: 16.3 % (ref 12.4–15.4)
EOSINOPHIL # BLD: 0.1 K/UL (ref 0–0.6)
EOSINOPHIL NFR BLD: 1.2 %
GFR SERPLBLD CREATININE-BSD FMLA CKD-EPI: >60 ML/MIN/{1.73_M2}
GLUCOSE SERPL-MCNC: 128 MG/DL (ref 70–99)
HCT VFR BLD AUTO: 41.4 % (ref 36–48)
HGB BLD-MCNC: 13.9 G/DL (ref 12–16)
LYMPHOCYTES # BLD: 0.8 K/UL (ref 1–5.1)
LYMPHOCYTES NFR BLD: 9.3 %
MCH RBC QN AUTO: 30.8 PG (ref 26–34)
MCHC RBC AUTO-ENTMCNC: 33.5 G/DL (ref 31–36)
MCV RBC AUTO: 91.8 FL (ref 80–100)
MONOCYTES # BLD: 0.7 K/UL (ref 0–1.3)
MONOCYTES NFR BLD: 8 %
NEUTROPHILS # BLD: 7.3 K/UL (ref 1.7–7.7)
NEUTROPHILS NFR BLD: 80.8 %
PLATELET # BLD AUTO: 271 K/UL (ref 135–450)
PMV BLD AUTO: 8.3 FL (ref 5–10.5)
POTASSIUM SERPL-SCNC: 4.2 MMOL/L (ref 3.5–5.1)
RBC # BLD AUTO: 4.51 M/UL (ref 4–5.2)
SODIUM SERPL-SCNC: 138 MMOL/L (ref 136–145)
WBC # BLD AUTO: 9 K/UL (ref 4–11)

## 2023-10-25 PROCEDURE — 99284 EMERGENCY DEPT VISIT MOD MDM: CPT

## 2023-10-25 PROCEDURE — 73502 X-RAY EXAM HIP UNI 2-3 VIEWS: CPT

## 2023-10-25 PROCEDURE — 36415 COLL VENOUS BLD VENIPUNCTURE: CPT

## 2023-10-25 PROCEDURE — 73610 X-RAY EXAM OF ANKLE: CPT

## 2023-10-25 PROCEDURE — 73560 X-RAY EXAM OF KNEE 1 OR 2: CPT

## 2023-10-25 PROCEDURE — 80048 BASIC METABOLIC PNL TOTAL CA: CPT

## 2023-10-25 PROCEDURE — 96374 THER/PROPH/DIAG INJ IV PUSH: CPT

## 2023-10-25 PROCEDURE — 6360000002 HC RX W HCPCS: Performed by: EMERGENCY MEDICINE

## 2023-10-25 PROCEDURE — 85025 COMPLETE CBC W/AUTO DIFF WBC: CPT

## 2023-10-25 RX ORDER — FENTANYL CITRATE 50 UG/ML
25 INJECTION, SOLUTION INTRAMUSCULAR; INTRAVENOUS ONCE
Status: COMPLETED | OUTPATIENT
Start: 2023-10-25 | End: 2023-10-25

## 2023-10-25 RX ADMIN — FENTANYL CITRATE 25 MCG: 50 INJECTION, SOLUTION INTRAMUSCULAR; INTRAVENOUS at 19:44

## 2023-10-25 ASSESSMENT — LIFESTYLE VARIABLES
HOW OFTEN DO YOU HAVE A DRINK CONTAINING ALCOHOL: NEVER
HOW MANY STANDARD DRINKS CONTAINING ALCOHOL DO YOU HAVE ON A TYPICAL DAY: PATIENT DOES NOT DRINK

## 2023-10-25 ASSESSMENT — PAIN SCALES - GENERAL
PAINLEVEL_OUTOF10: 9
PAINLEVEL_OUTOF10: 9

## 2023-10-25 ASSESSMENT — PAIN - FUNCTIONAL ASSESSMENT: PAIN_FUNCTIONAL_ASSESSMENT: 0-10

## 2023-10-25 NOTE — ED PROVIDER NOTES
[address] [id number] ED  EMERGENCY DEPARTMENT ENCOUNTER        Pt Name: Faye Solorzano  MRN: [id number]  [address] [facility] 6/9/1926  Date of evaluation: 10/25/2023  Provider: Little Reed MD  PCP: Talbert Spurling, MD  Note Started: 7:42 PM EDT 10/25/23    CHIEF COMPLAINT       Chief Complaint   Patient presents with    Hip Pain     From [facility]. Alexa Gain getting into the shower. Left hip and ankle pain. Squad gave 50 mcg of fentanyl and 4 mg zofran ivp. HISTORY OF PRESENT ILLNESS: 1 or more Elements     History from : Patient and EMS    Limitations to history : None    Faye Solorzano is a 80 y.o. female who presents emergency department status post fall. Patient states that she slipped and fell. She typically uses a walker. She is from a nursing home. She complains of left hip, left knee and left ankle pain. No head injury, neck pain, LOC. She did not get dizzy or lightheaded prior to fall    Nursing Notes were all reviewed and agreed with or any disagreements were addressed in the HPI. REVIEW OF SYSTEMS :      Review of Systems    10 systems reviewed and negative except as in HPI/MDM    SURGICAL HISTORY     Past Surgical History:   Procedure Laterality Date    BREAST LUMPECTOMY  2009    GR/DC POSITIVE    CARPAL TUNNEL RELEASE  8/30    CORONARY ANGIOPLASTY      HUMERUS SURGERY  08-30-11    HEMIARTHROPLASTY RIGHT PROXIMAL HUMERUS DEPUY    HYSTERECTOMY (CERVIX STATUS UNKNOWN)      OTHER SURGICAL HISTORY Left 02/25/2013    ischemic fasciitis, thigh, Dr Silverio Isbell      orif right humerus    WRIST SURGERY      with plate and screws right       CURRENTMEDICATIONS       Previous Medications    AMLODIPINE (NORVASC) 5 MG TABLET    Take 5 mg by mouth daily    ASPIRIN 81 MG TABLET    Take 81 mg by mouth daily    CLOBETASOL (TEMOVATE) 0.05 % CREAM    Apply twice a day as needed. Do not use on the face.     COD LIVER OIL 1000 MG CAPS    Take by mouth    COENZYME Q10 10

## 2023-10-26 NOTE — ED NOTES
This RN at bedside as x1 assist to stand with patient. Pt able to stand but continually stating, \"I can't do it, I can't put weight on it! .\" Patient reports increase in pain when standing, pt able to adjust self back into bed.      Maria Esther Camacho RN  10/25/23 2057

## 2023-10-26 NOTE — ED NOTES
This RN attempted to call patient's [facility] to notify of pt discharge.  No answer at this time     Flash Pugh RN  10/25/23 0436